# Patient Record
Sex: FEMALE
[De-identification: names, ages, dates, MRNs, and addresses within clinical notes are randomized per-mention and may not be internally consistent; named-entity substitution may affect disease eponyms.]

---

## 2023-05-02 PROBLEM — Z00.00 ENCOUNTER FOR PREVENTIVE HEALTH EXAMINATION: Status: ACTIVE | Noted: 2023-05-02

## 2023-05-03 ENCOUNTER — APPOINTMENT (OUTPATIENT)
Dept: HEMATOLOGY ONCOLOGY | Facility: CLINIC | Age: 23
End: 2023-05-03
Payer: COMMERCIAL

## 2023-05-03 ENCOUNTER — TRANSCRIPTION ENCOUNTER (OUTPATIENT)
Age: 23
End: 2023-05-03

## 2023-05-03 VITALS
BODY MASS INDEX: 21 KG/M2 | DIASTOLIC BLOOD PRESSURE: 77 MMHG | OXYGEN SATURATION: 100 % | TEMPERATURE: 98.6 F | HEART RATE: 99 BPM | HEIGHT: 61 IN | RESPIRATION RATE: 16 BRPM | WEIGHT: 111.25 LBS | SYSTOLIC BLOOD PRESSURE: 110 MMHG

## 2023-05-03 DIAGNOSIS — N39.0 URINARY TRACT INFECTION, SITE NOT SPECIFIED: ICD-10-CM

## 2023-05-03 DIAGNOSIS — Z78.9 OTHER SPECIFIED HEALTH STATUS: ICD-10-CM

## 2023-05-03 DIAGNOSIS — Z87.891 PERSONAL HISTORY OF NICOTINE DEPENDENCE: ICD-10-CM

## 2023-05-03 DIAGNOSIS — D64.9 ANEMIA, UNSPECIFIED: ICD-10-CM

## 2023-05-03 DIAGNOSIS — Z80.1 FAMILY HISTORY OF MALIGNANT NEOPLASM OF TRACHEA, BRONCHUS AND LUNG: ICD-10-CM

## 2023-05-03 PROCEDURE — 99205 OFFICE O/P NEW HI 60 MIN: CPT

## 2023-05-03 RX ORDER — FAMOTIDINE 40 MG/1
TABLET, FILM COATED ORAL
Refills: 0 | Status: ACTIVE | COMMUNITY

## 2023-05-03 NOTE — HISTORY OF PRESENT ILLNESS
[de-identified] : Mrs.Jessica Montoyaiviptyrese is a 22 year old female who present to the office for DONNA from a hospital follow up.\par She denies being on a modified diet, she is not currently taking iron supplements and not currently pregnant. She recently presented to [facility] with complaints of severe back pain: she was found to have a Hgb: 7.2, HCT: 26.5, platelets were 445 and iron saturation 3% and iron 16. Ferritins were not performed. \par A CT scan of her abdomen and pelvis was performed which revealed no evidence of obstructive uropathy, pyelonephritis, no urolithiasis, mild circumferential urinary bladder wall thickening, correlated for cystitis, wall thickening versus underdistention at the rectosigmoid junction, correlate for colitis.  \par Past hx: none\par \par \par Age of menarche: 15 year old: regular menses\par LMP: 2023\par OCP: Last week she had the nexplant placed\par HRT: none\par \par \par Social hx:\par Smoker: marijuana, e-cig: only for a month/daily: last time midmarch\par ETOH:none \par Illicit drugs: marijuana \par  \par \par Family hx: \par Grandfather: lung cancer (smoker) \par

## 2023-05-03 NOTE — REVIEW OF SYSTEMS
[Fever] : fever [Fatigue] : fatigue [Chest Pain] : chest pain [SOB on Exertion] : shortness of breath during exertion [Negative] : Allergic/Immunologic

## 2023-05-03 NOTE — ASSESSMENT
[FreeTextEntry1] : Microcytic anemia\par Iron deficiency\par Hgb 7.2\par Ferritin 2\par LMP 4 weeks ago. Last 5 days. Not heavy\par Had daily spotting until she had Nexplanon implant removed\par No family h/o colorectal cancer\par Does complain of Upper GI symptoms\par \par Records from ER reviewed analyzed and discussed\par Discussed at length about iron deficiency- etiology, signs and symptoms, complications, management options\par DIscussed about oral vs IV Iron\par I have reviewed the risks, benefits and side effects of IV iron with the patient. All questions were answered to satisfaction. Patient agrees to pursue IV iron.\par Schedule IV injectafer 750 mg x 2 \par Repeat blood work including CBC, ferritin, iron studies in 5-6 weeks. \par Further IV iron PRN for ferritin < \par Information given in writing\par Refer to GI given her upper GI symptoms and her degree of anemia is not entirely explained by her menstrual symptoms \par \par Patient had multiple questions which were answered to satisfaction\par \par Labs in 6-8 weeks\par CBC, CMP, anemia panel, celiac panel\par OV few days later

## 2023-06-28 ENCOUNTER — RESULT REVIEW (OUTPATIENT)
Age: 23
End: 2023-06-28

## 2023-06-28 ENCOUNTER — APPOINTMENT (OUTPATIENT)
Dept: HEMATOLOGY ONCOLOGY | Facility: CLINIC | Age: 23
End: 2023-06-28

## 2023-06-28 VITALS
OXYGEN SATURATION: 100 % | BODY MASS INDEX: 22.1 KG/M2 | DIASTOLIC BLOOD PRESSURE: 66 MMHG | WEIGHT: 117.06 LBS | SYSTOLIC BLOOD PRESSURE: 108 MMHG | RESPIRATION RATE: 17 BRPM | HEART RATE: 100 BPM | HEIGHT: 61 IN | TEMPERATURE: 97.8 F

## 2023-07-03 ENCOUNTER — APPOINTMENT (OUTPATIENT)
Dept: GASTROENTEROLOGY | Facility: CLINIC | Age: 23
End: 2023-07-03
Payer: COMMERCIAL

## 2023-07-03 ENCOUNTER — NON-APPOINTMENT (OUTPATIENT)
Age: 23
End: 2023-07-03

## 2023-07-03 VITALS
WEIGHT: 117 LBS | HEIGHT: 61 IN | SYSTOLIC BLOOD PRESSURE: 118 MMHG | RESPIRATION RATE: 18 BRPM | HEART RATE: 77 BPM | OXYGEN SATURATION: 99 % | BODY MASS INDEX: 22.09 KG/M2 | DIASTOLIC BLOOD PRESSURE: 62 MMHG

## 2023-07-03 DIAGNOSIS — K21.9 GASTRO-ESOPHAGEAL REFLUX DISEASE W/OUT ESOPHAGITIS: ICD-10-CM

## 2023-07-03 DIAGNOSIS — D50.9 IRON DEFICIENCY ANEMIA, UNSPECIFIED: ICD-10-CM

## 2023-07-03 DIAGNOSIS — R10.13 EPIGASTRIC PAIN: ICD-10-CM

## 2023-07-03 DIAGNOSIS — R14.0 ABDOMINAL DISTENSION (GASEOUS): ICD-10-CM

## 2023-07-03 DIAGNOSIS — K92.1 MELENA: ICD-10-CM

## 2023-07-03 PROCEDURE — 99204 OFFICE O/P NEW MOD 45 MIN: CPT

## 2023-07-03 RX ORDER — PANTOPRAZOLE SODIUM 40 MG/1
40 TABLET, DELAYED RELEASE ORAL
Refills: 0 | Status: ACTIVE | COMMUNITY

## 2023-07-03 RX ORDER — SODIUM SULFATE, POTASSIUM SULFATE AND MAGNESIUM SULFATE 1.6; 3.13; 17.5 G/177ML; G/177ML; G/177ML
17.5-3.13-1.6 SOLUTION ORAL
Qty: 1 | Refills: 0 | Status: ACTIVE | COMMUNITY
Start: 2023-07-03 | End: 1900-01-01

## 2023-07-03 NOTE — HISTORY OF PRESENT ILLNESS
[FreeTextEntry1] : 21 yo F  referred from [facility] for DONNA.\par \par c/o abdominal pain and gas\par BM twice a day- formed\par sees streaks of red on top of the stool\par c/o throat pain, was told acid reflux -- takes famotidine and protonix. helps but if stops get recurrent reflux symptoms. \par denies heavy periods\par denies nose bleeds\par eats chicken/turkey, not a lot of red meat\par \par Anemic to 7.2, plts 445, iron sat 3%\par IV iron infusion per heme\par labs - neg celiac\par most recent CBD hb 13.9\par \par \par Soc: no tobacco or significant EtOH\par \par Family Hx: no significant GI family history including colon cancer, stomach cancer, IBD, celiac\par \par ROS:\par constitutional: no weight loss, fevers\par ENT: no deafness\par Eyes: no blindness\par Neck: no lymphadenopathy\par Chest: no shortness of breath, no cough\par Cardiac: no chest pain, no palpitations\par Vascular: no leg swelling\par GI: no abdominal pain, nausea, vomiting, diarrhea, constipation, rectal bleeding, melena, dysphagia,  unless otherwise noted in HPI\par : no dysuria, dark urine\par Skin: no rashes, lesions, jaundice\par Heme: no bleeding\par Endocrine: no DM  unless otherwise stated in HPI\par \par Physical Exam: (VS noted below)\par General: alert, comfortable, in no acute distress\par Eyes: normal sclera, anicteric\par Neck: normal, supple, no neck mass\par Pulm: no respiratory distress, clear to auscultation bilaterally \par Heart: RRR, normal S1 S2\par Abd: Soft, non-tender, non-distended, normal bowel sounds, no appreciable hepatosplenomegaly, no masses palpated\par Rectal: deferred\par Ext: warm and well perfused, no edema\par Skin: no rashes, no juandice\par Neuro: alert, grossly nonfocal\par \par Labs/imaging/prior endoscopic results were reviewed to the extent available and pertinent findings noted in HPI\par

## 2023-07-03 NOTE — ASSESSMENT
[FreeTextEntry1] : 21 yo F with iron deficiency anemia- c/o abd pain, gas, and blood in stool. \par \par Will plan on an upper endoscopy for DONNA. Explained nature of procedure and the risks/benefits/alternative including but not limited to bleeding, infection, perforation, missed lesions, anesthesia complications. Patient understands and agrees, all questions answered.\par \par Will plan on a colonoscopy for DONNA.\par Explained the risks (including but not limited to cardiopulmonary / anesthesia complications, bleeding, infection, perforation, aspiration, missed lesions, internal organ injury), benefits, and alternatives. Preparation for the procedure was reviewed with the patient. The patient was informed that she/he would be given IV anesthesia by an anesthesiologist during the procedure. The patient was informed that a family member or friend must drive the patient following recovery from the procedure. The patient understands and agrees, all questions answered. Will use a suprep bowel prep with clears the day prior. \par \par \par \par

## 2023-07-03 NOTE — CONSULT LETTER
[Dear  ___] : Dear  [unfilled], [Consult Letter:] : I had the pleasure of evaluating your patient, [unfilled]. [Please see my note below.] : Please see my note below. [Consult Closing:] : Thank you very much for allowing me to participate in the care of this patient.  If you have any questions, please do not hesitate to contact me. [Sincerely,] : Sincerely, [FreeTextEntry3] : Darcy Truong M.D.\par

## 2023-07-05 ENCOUNTER — APPOINTMENT (OUTPATIENT)
Dept: HEMATOLOGY ONCOLOGY | Facility: CLINIC | Age: 23
End: 2023-07-05
Payer: COMMERCIAL

## 2023-07-05 VITALS
BODY MASS INDEX: 22.15 KG/M2 | HEART RATE: 71 BPM | DIASTOLIC BLOOD PRESSURE: 67 MMHG | RESPIRATION RATE: 18 BRPM | HEIGHT: 61 IN | WEIGHT: 117.31 LBS | OXYGEN SATURATION: 98 % | TEMPERATURE: 97.8 F | SYSTOLIC BLOOD PRESSURE: 110 MMHG

## 2023-07-05 DIAGNOSIS — D50.9 IRON DEFICIENCY ANEMIA, UNSPECIFIED: ICD-10-CM

## 2023-07-05 PROCEDURE — 99213 OFFICE O/P EST LOW 20 MIN: CPT | Mod: 25

## 2023-07-05 RX ORDER — AMOXICILLIN 500 MG/1
500 CAPSULE ORAL
Refills: 0 | Status: DISCONTINUED | COMMUNITY
End: 2023-07-05

## 2023-07-06 NOTE — ASSESSMENT
[FreeTextEntry1] : ## Microcytic anemia\par Iron deficiency\par Hgb 7.2\par Ferritin 2 with symptoms of extreme fatigue and brain fog\par Regular to minimal menses\par No family h/o colorectal cancer\par s/p Injectafer x 2 in May 2023\par \par Patient is here for follow up\par initially felt better with IV Injectafer x 2 in May 2023 but now with extreme fatigue, lightheaded, and brain fog\par Labs reviewed, analyzed, and discussed\par Work up suggestive of DONNA 2/2 blood loss. \par Ferritin improved to 87 but given her symptoms - will give 1 injectafer IV.\par To follow up with Dr. Truong for EGD/Colonoscopy .\par \par Patient had multiple questions which were answered to satisfaction\par \par d/w Dr. Allison \par Labs in 3 months for follow up\par CBC, CMP, Ferritin, iron panel. \par

## 2023-07-06 NOTE — HISTORY OF PRESENT ILLNESS
[de-identified] : Mrs.Jessica Montoyaiviptyrese is a 22 year old female who present to the office for DONNA from a hospital follow up.\par She denies being on a modified diet, she is not currently taking iron supplements and not currently pregnant. She recently presented to [place] ER with complaints of severe back pain: she was found to have a Hgb: 7.2, HCT: 26.5, platelets were 445 and iron saturation 3% and iron 16. Ferritins were not performed. \par A CT scan of her abdomen and pelvis was performed which revealed no evidence of obstructive uropathy, pyelonephritis, no urolithiasis, mild circumferential urinary bladder wall thickening, correlated for cystitis, wall thickening versus underdistention at the rectosigmoid junction, correlate for colitis.  \par Past hx: none\par \par \par Age of menarche: 15 year old: regular menses\par LMP: 2023\par OCP: Last week she had the nexplant placed\par HRT: none\par \par \par Social hx:\par Smoker: marijuana, e-cig: only for a month/daily: last time midmarch\par ETOH:none \par Illicit drugs: marijuana \par  \par \par Family hx: \par Grandfather: lung cancer (smoker) \par   [de-identified] : Patient is here for follow up\par s/p Injectafer x 2 in May 2023\par \par She felt better with improved fatigue following her IV iron infusion for a week or two, now with extreme fatigue, brain fog, and lightheaded again. \par seen by GI Dr. Truong and awaiting to schedule for  EGD/Colonoscopy

## 2023-08-11 ENCOUNTER — APPOINTMENT (OUTPATIENT)
Dept: GASTROENTEROLOGY | Facility: HOSPITAL | Age: 23
End: 2023-08-11

## 2023-10-13 ENCOUNTER — APPOINTMENT (OUTPATIENT)
Dept: HEMATOLOGY ONCOLOGY | Facility: CLINIC | Age: 23
End: 2023-10-13

## 2023-10-18 ENCOUNTER — APPOINTMENT (OUTPATIENT)
Dept: HEMATOLOGY ONCOLOGY | Facility: CLINIC | Age: 23
End: 2023-10-18

## 2023-10-25 ENCOUNTER — RESULT REVIEW (OUTPATIENT)
Age: 23
End: 2023-10-25

## 2023-10-26 ENCOUNTER — TRANSCRIPTION ENCOUNTER (OUTPATIENT)
Age: 23
End: 2023-10-26

## 2023-10-26 ENCOUNTER — RESULT REVIEW (OUTPATIENT)
Age: 23
End: 2023-10-26

## 2023-10-26 ENCOUNTER — APPOINTMENT (OUTPATIENT)
Dept: GASTROENTEROLOGY | Facility: HOSPITAL | Age: 23
End: 2023-10-26
Payer: COMMERCIAL

## 2023-10-26 PROCEDURE — 45380 COLONOSCOPY AND BIOPSY: CPT

## 2023-10-26 PROCEDURE — 43239 EGD BIOPSY SINGLE/MULTIPLE: CPT

## 2023-11-09 ENCOUNTER — RESULT REVIEW (OUTPATIENT)
Age: 23
End: 2023-11-09

## 2023-11-09 ENCOUNTER — APPOINTMENT (OUTPATIENT)
Dept: HEMATOLOGY ONCOLOGY | Facility: CLINIC | Age: 23
End: 2023-11-09

## 2023-11-09 VITALS
BODY MASS INDEX: 24.92 KG/M2 | HEIGHT: 61 IN | TEMPERATURE: 98.5 F | RESPIRATION RATE: 16 BRPM | WEIGHT: 132 LBS | HEART RATE: 75 BPM | DIASTOLIC BLOOD PRESSURE: 69 MMHG | OXYGEN SATURATION: 98 % | SYSTOLIC BLOOD PRESSURE: 110 MMHG

## 2023-11-15 ENCOUNTER — NON-APPOINTMENT (OUTPATIENT)
Age: 23
End: 2023-11-15

## 2023-11-16 ENCOUNTER — APPOINTMENT (OUTPATIENT)
Dept: HEMATOLOGY ONCOLOGY | Facility: CLINIC | Age: 23
End: 2023-11-16

## 2025-01-22 ENCOUNTER — APPOINTMENT (OUTPATIENT)
Dept: HEMATOLOGY ONCOLOGY | Facility: CLINIC | Age: 25
End: 2025-01-22
Payer: COMMERCIAL

## 2025-01-22 ENCOUNTER — RESULT REVIEW (OUTPATIENT)
Age: 25
End: 2025-01-22

## 2025-01-22 VITALS
DIASTOLIC BLOOD PRESSURE: 75 MMHG | HEART RATE: 96 BPM | SYSTOLIC BLOOD PRESSURE: 122 MMHG | HEIGHT: 61 IN | WEIGHT: 141.25 LBS | OXYGEN SATURATION: 100 % | RESPIRATION RATE: 16 BRPM | BODY MASS INDEX: 26.67 KG/M2 | TEMPERATURE: 98.3 F

## 2025-01-22 DIAGNOSIS — E61.1 IRON DEFICIENCY: ICD-10-CM

## 2025-01-22 PROCEDURE — 99214 OFFICE O/P EST MOD 30 MIN: CPT

## 2025-01-22 PROCEDURE — G2211 COMPLEX E/M VISIT ADD ON: CPT

## 2025-01-22 PROCEDURE — 36415 COLL VENOUS BLD VENIPUNCTURE: CPT

## 2025-01-23 ENCOUNTER — NON-APPOINTMENT (OUTPATIENT)
Age: 25
End: 2025-01-23

## 2025-01-27 ENCOUNTER — NON-APPOINTMENT (OUTPATIENT)
Age: 25
End: 2025-01-27

## 2025-05-22 ENCOUNTER — RESULT REVIEW (OUTPATIENT)
Age: 25
End: 2025-05-22

## 2025-05-22 ENCOUNTER — APPOINTMENT (OUTPATIENT)
Dept: HEMATOLOGY ONCOLOGY | Facility: CLINIC | Age: 25
End: 2025-05-22
Payer: COMMERCIAL

## 2025-05-22 VITALS
TEMPERATURE: 97.7 F | WEIGHT: 141.06 LBS | DIASTOLIC BLOOD PRESSURE: 55 MMHG | RESPIRATION RATE: 16 BRPM | SYSTOLIC BLOOD PRESSURE: 104 MMHG | BODY MASS INDEX: 26.63 KG/M2 | HEIGHT: 61 IN | HEART RATE: 55 BPM | OXYGEN SATURATION: 99 %

## 2025-05-22 DIAGNOSIS — E61.1 IRON DEFICIENCY: ICD-10-CM

## 2025-05-22 PROCEDURE — 99213 OFFICE O/P EST LOW 20 MIN: CPT

## 2025-05-22 PROCEDURE — 36415 COLL VENOUS BLD VENIPUNCTURE: CPT
